# Patient Record
Sex: MALE | Race: WHITE | NOT HISPANIC OR LATINO | URBAN - METROPOLITAN AREA
[De-identification: names, ages, dates, MRNs, and addresses within clinical notes are randomized per-mention and may not be internally consistent; named-entity substitution may affect disease eponyms.]

---

## 2023-03-16 LAB
ALBUMIN (G/DL) IN SER/PLAS: 4.7 G/DL (ref 3.4–5)
ANION GAP IN SER/PLAS: 10 MMOL/L (ref 10–20)
APPEARANCE, URINE: CLEAR
BILIRUBIN, URINE: NEGATIVE
BLOOD, URINE: NEGATIVE
CALCIUM (MG/DL) IN SER/PLAS: 9.8 MG/DL (ref 8.6–10.3)
CARBON DIOXIDE, TOTAL (MMOL/L) IN SER/PLAS: 32 MMOL/L (ref 21–32)
CHLORIDE (MMOL/L) IN SER/PLAS: 101 MMOL/L (ref 98–107)
COLOR, URINE: COLORLESS
CREATININE (MG/DL) IN SER/PLAS: 1.02 MG/DL (ref 0.5–1.3)
CREATININE (MG/DL) IN URINE: 12 MG/DL (ref 20–370)
GFR MALE: >90 ML/MIN/1.73M2
GLUCOSE (MG/DL) IN SER/PLAS: 83 MG/DL (ref 74–99)
GLUCOSE, URINE: NEGATIVE MG/DL
KETONES, URINE: NEGATIVE MG/DL
LEUKOCYTE ESTERASE, URINE: NEGATIVE
NITRITE, URINE: NEGATIVE
PH, URINE: 7 (ref 5–8)
PHOSPHATE (MG/DL) IN SER/PLAS: 2.8 MG/DL (ref 2.5–4.9)
POTASSIUM (MMOL/L) IN SER/PLAS: 4.2 MMOL/L (ref 3.5–5.3)
PROTEIN, URINE: NEGATIVE MG/DL
SODIUM (MMOL/L) IN SER/PLAS: 139 MMOL/L (ref 136–145)
SPECIFIC GRAVITY, URINE: 1 (ref 1–1.03)
UREA NITROGEN (MG/DL) IN SER/PLAS: 11 MG/DL (ref 6–23)
UROBILINOGEN, URINE: <2 MG/DL (ref 0–1.9)

## 2024-06-10 ENCOUNTER — HOSPITAL ENCOUNTER (OUTPATIENT)
Dept: RADIOLOGY | Facility: CLINIC | Age: 24
Discharge: HOME | End: 2024-06-10
Payer: COMMERCIAL

## 2024-06-10 ENCOUNTER — OFFICE VISIT (OUTPATIENT)
Dept: SPORTS MEDICINE | Facility: CLINIC | Age: 24
End: 2024-06-10
Payer: COMMERCIAL

## 2024-06-10 VITALS
HEART RATE: 85 BPM | DIASTOLIC BLOOD PRESSURE: 64 MMHG | BODY MASS INDEX: 25.05 KG/M2 | HEIGHT: 70 IN | SYSTOLIC BLOOD PRESSURE: 120 MMHG | WEIGHT: 175 LBS

## 2024-06-10 DIAGNOSIS — M54.12 LEFT CERVICAL RADICULOPATHY: ICD-10-CM

## 2024-06-10 DIAGNOSIS — M62.838 CERVICAL PARASPINAL MUSCLE SPASM: ICD-10-CM

## 2024-06-10 DIAGNOSIS — S49.92XA INJURY OF GLENOID LABRUM, LEFT, INITIAL ENCOUNTER: ICD-10-CM

## 2024-06-10 DIAGNOSIS — S16.1XXA CERVICAL STRAIN, ACUTE, INITIAL ENCOUNTER: ICD-10-CM

## 2024-06-10 DIAGNOSIS — M51.26 HERNIATED LUMBAR DISC WITHOUT MYELOPATHY: ICD-10-CM

## 2024-06-10 DIAGNOSIS — S43.102A AC SEPARATION, LEFT, INITIAL ENCOUNTER: ICD-10-CM

## 2024-06-10 DIAGNOSIS — M46.42 DISCITIS OF CERVICAL REGION: ICD-10-CM

## 2024-06-10 DIAGNOSIS — M54.2 CERVICALGIA: Primary | ICD-10-CM

## 2024-06-10 DIAGNOSIS — S43.002A SHOULDER SUBLUXATION, LEFT, INITIAL ENCOUNTER: ICD-10-CM

## 2024-06-10 DIAGNOSIS — M54.2 CERVICALGIA: ICD-10-CM

## 2024-06-10 DIAGNOSIS — M95.8 BILATERAL WINGED SCAPULA: ICD-10-CM

## 2024-06-10 DIAGNOSIS — M25.512 ACUTE PAIN OF LEFT SHOULDER: ICD-10-CM

## 2024-06-10 DIAGNOSIS — S46.012A ROTATOR CUFF STRAIN, LEFT, INITIAL ENCOUNTER: ICD-10-CM

## 2024-06-10 DIAGNOSIS — M75.22 BICEPS TENDINITIS, LEFT: ICD-10-CM

## 2024-06-10 PROCEDURE — 99204 OFFICE O/P NEW MOD 45 MIN: CPT | Performed by: FAMILY MEDICINE

## 2024-06-10 PROCEDURE — 96372 THER/PROPH/DIAG INJ SC/IM: CPT | Performed by: FAMILY MEDICINE

## 2024-06-10 PROCEDURE — 1036F TOBACCO NON-USER: CPT | Performed by: FAMILY MEDICINE

## 2024-06-10 PROCEDURE — 99214 OFFICE O/P EST MOD 30 MIN: CPT | Performed by: FAMILY MEDICINE

## 2024-06-10 PROCEDURE — 72050 X-RAY EXAM NECK SPINE 4/5VWS: CPT

## 2024-06-10 PROCEDURE — 72050 X-RAY EXAM NECK SPINE 4/5VWS: CPT | Performed by: RADIOLOGY

## 2024-06-10 PROCEDURE — 73030 X-RAY EXAM OF SHOULDER: CPT | Mod: LEFT SIDE | Performed by: RADIOLOGY

## 2024-06-10 PROCEDURE — 73030 X-RAY EXAM OF SHOULDER: CPT | Mod: LT

## 2024-06-10 PROCEDURE — 2500000004 HC RX 250 GENERAL PHARMACY W/ HCPCS (ALT 636 FOR OP/ED): Performed by: FAMILY MEDICINE

## 2024-06-10 RX ORDER — NAPROXEN 250 MG/1
250 TABLET ORAL
COMMUNITY

## 2024-06-10 RX ORDER — CYCLOBENZAPRINE HCL 10 MG
10 TABLET ORAL NIGHTLY PRN
Qty: 30 TABLET | Refills: 0 | Status: SHIPPED | OUTPATIENT
Start: 2024-06-10 | End: 2024-07-10

## 2024-06-10 RX ORDER — KETOROLAC TROMETHAMINE 30 MG/ML
30 INJECTION, SOLUTION INTRAMUSCULAR; INTRAVENOUS ONCE
Status: COMPLETED | OUTPATIENT
Start: 2024-06-10 | End: 2024-06-10

## 2024-06-10 RX ORDER — CYCLOBENZAPRINE HCL 10 MG
10 TABLET ORAL NIGHTLY PRN
COMMUNITY
Start: 2024-05-28 | End: 2024-06-10 | Stop reason: SDUPTHER

## 2024-06-10 RX ORDER — CALCIUM CARBONATE/VITAMIN D3 500-10/5ML
1 LIQUID (ML) ORAL
COMMUNITY

## 2024-06-10 RX ORDER — PREDNISONE 20 MG/1
TABLET ORAL
Qty: 30 TABLET | Refills: 0 | Status: SHIPPED | OUTPATIENT
Start: 2024-06-10

## 2024-06-10 RX ORDER — ACETAMINOPHEN 500 MG
500 TABLET ORAL EVERY 6 HOURS PRN
COMMUNITY

## 2024-06-10 RX ORDER — METHYLPREDNISOLONE ACETATE 40 MG/ML
40 INJECTION, SUSPENSION INTRA-ARTICULAR; INTRALESIONAL; INTRAMUSCULAR; SOFT TISSUE ONCE
Status: COMPLETED | OUTPATIENT
Start: 2024-06-10 | End: 2024-06-10

## 2024-06-10 RX ADMIN — METHYLPREDNISOLONE ACETATE 40 MG: 40 INJECTION, SUSPENSION INTRA-ARTICULAR; INTRALESIONAL; INTRAMUSCULAR; SOFT TISSUE at 15:56

## 2024-06-10 RX ADMIN — KETOROLAC TROMETHAMINE 30 MG: 60 INJECTION, SOLUTION INTRAMUSCULAR at 15:54

## 2024-06-10 RX ADMIN — METHYLPREDNISOLONE SODIUM SUCCINATE 125 MG: 125 INJECTION, POWDER, FOR SOLUTION INTRAMUSCULAR; INTRAVENOUS at 15:55

## 2024-06-10 ASSESSMENT — LIFESTYLE VARIABLES
HOW OFTEN DO YOU HAVE A DRINK CONTAINING ALCOHOL: 2-4 TIMES A MONTH
SKIP TO QUESTIONS 9-10: 1
HAS A RELATIVE, FRIEND, DOCTOR, OR ANOTHER HEALTH PROFESSIONAL EXPRESSED CONCERN ABOUT YOUR DRINKING OR SUGGESTED YOU CUT DOWN: NO
AUDIT TOTAL SCORE: 2
HOW MANY STANDARD DRINKS CONTAINING ALCOHOL DO YOU HAVE ON A TYPICAL DAY: 1 OR 2
AUDIT-C TOTAL SCORE: 2
HOW OFTEN DO YOU HAVE SIX OR MORE DRINKS ON ONE OCCASION: NEVER
HAVE YOU OR SOMEONE ELSE BEEN INJURED AS A RESULT OF YOUR DRINKING: NO

## 2024-06-10 ASSESSMENT — PAIN SCALES - GENERAL
PAINLEVEL: 5
PAINLEVEL_OUTOF10: 7

## 2024-06-10 ASSESSMENT — PAIN - FUNCTIONAL ASSESSMENT: PAIN_FUNCTIONAL_ASSESSMENT: 0-10

## 2024-06-10 NOTE — PATIENT INSTRUCTIONS
Treatment or Intervention:  May continue to alternate moist heat and ice as needed     Reviewed handout rotator cuff injury and/or labral injury in detail with the patient to the level of their understanding; a copy of this handout was provided to the patient at the time of this office visit.   Start into Physical Therapy 1-2 times a week for 8-10 weeks with manual therapy as well as dry needling, cervical traction, and IASTM    Reviewed home exercises to be performed by the patient routinely, including wall crawls, circumduction exercises, resistance band exercises, as well as additional exercises from rotator cuff and/or labral    Will recommend at future visit: over-the-counter calcium with vitamin-D 2 -3000+ milligrams a day, as well as OTC symphytum as directed daily to promote bony healing, in addition to a daily multivitamin.  ,  Will recommend at future visit:  over-the-counter curcumin, turmeric, boswellia, as well as egg shell membrane as directed to aid with joint inflammation.    Will recommend at future visit:  over-the-counter Move Free for joint health.     Patient received IM injection of SoluMedrol 125 milligrams at the time of this office visit. , Patient received IM injection of Toradol 60 milligrams at the time of this office visit., Patient received IM injection of DepoMedrol 40 milligrams at the time of this office visit., and  The patient tolerated the injection(s) well and without any adverse effects as observed, verified s/p 15 minutes.  The following prescription was electronically sent to the patient's pharmacy of record: 10 DAY TAPER:  The following prescription was electronically sent to the patient's pharmacy of record: Prednisone 20mg, take 4 tablets a day x 4 days, 3 tabletss a day x 3 days, 2 tablets a day x 2 days, and 1 tablet a day x 1 day with food; start tomorrow if received Solu-Medrol injection in the office at visit, otherwise start immediately, dispense quantity sufficient,  with no refills   The following prescription was electronically sent to the patient's pharmacy of record: Flexeril (Cyclobenzaprine) 10 milligrams, may take 1 tablet 1-2 hours before bedtime as needed for muscle relaxation, Duration: 30 days, Dispense: #30, Refill: 0    the patient may alternate with OTC Tylenol Extra Strength or OTC Tylenol Arthritis, taking one every 6-8 hours with food as needed.  ,  Patient advised regarding the risks and/or potential adverse reactions and/or side effects of any prescribed medications along with any over-the-counter medications or any supplements used. Patient advised to seek immediate medical care if any adverse reactions occur. The patient and/or patient(s) parent(s) verbalized their understanding.     Discussed in detail with the patient to the level of their understanding the possibility in the future of regenerative injections versus corticosteroid injections  ,   MR Arthrogram of the patient's  LEFT Shoulder to rule out labrum tear vs rotator cuff tear vs AC joint separation vs other   MR Cervical spine to rule out strain vs herniated or bulging disc vs other   You have been ordered an MRI of the Cervical spine and an MR Arthrogram of the LEFT Shoulder. Once you contact scheduling at [phone] and obtain the date and time of your MRI/MR Arthrogram, contact our office at [phone] to schedule your follow-up appointment to review your results. Please note the results of your imaging will not be discussed over the telephone.  Follow up after MR Arthrogram of the LEFT shoulder and Cervical Spine MRI, sooner if needed.

## 2024-06-10 NOTE — PROGRESS NOTES
Verbal consent of the patient and/or verbal parental consent for patients under the age of 18 have been obtained to conduct a physical examination at this office visit.    New patient  History Of Present Illness  06/10/24 Miguel Valentin is a 23 y.o. male who presents for an evaluation of their Left Shoulder and CERVICAL spine. 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 He states that he was performing pushups when he suddenly felt his LEFT arm collapse where he subsequently blacked out and could not see out of his LEFT eye. The pain he experienced was posterior in his shoulder and went up into his cervical spine. At that time he consulted with the medical staff at the [facility] who did not perform any imaging and prescribed him prednisone for 3 weeks. His symptoms resolved and he had been doing well until 3 weeks ago on 5/20/24 when he fell asleep standing in the shower after working many hours as a  and collided his LEFT shoulder with the wall while side bending his CERVICAL spine to the RIGHT. He once again has similar pain to the posterior aspect of his shoulder up his trapezius and into the LEFT side of his CERVICAL spine. He feels that the muscles of the area are rather tight. The pain is worsened with neck extension where he gets shooting pain into his shoulder.  He has been doing physical therapy however it has been making him worse so he was sent into the office for further evaluation and workup since it has not been getting any better and his symptoms are getting worse for physical therapy.  He denies any radiating symptoms but does have some clicking and worsening of pain with OH movements. Miguel reports a feeling of 'restlessness' down his LEFT arm. The pain presently is 3/10 but will reach 7/10 with movement. He did seek treatment at the [facility] on 5/27/24; however, the  facility did not have any power and therefore he did not receive any imaging. He was prescribed cyclobenzaprine which he has taken a few times when he is not working in addition to Aleve, tylenol and magnesium   However however today did not seem to be helping his pain.  He is unable to take NSAIDs because of only having 1 kidney..     All previous Progress Notes and imaging results related to this patients chief complaint have been reviewed in preparation for this examination.    Past Medical History  He has a past medical history of Migraines, Multicystic kidney disease, and Other specified health status.    Surgical History  He has a past surgical history that includes Nephrectomy (Left).     Social History  He reports that he has never smoked. He has never used smokeless tobacco. No history on file for alcohol use and drug use.    Family History  No family history on file.     Allergies  Patient has no known allergies.    Review of Systems  CONSTITUTIONAL:   Negative for weight change, loss of appetite, fatigue, weakness, fever, chills, night sweats, headaches .           HEENT:   Negative for cold, cough, sore throat, sinus pain, swollen lymph nodes.           OPHTHALMOLOGY:   Negative for diminished vision, blurred vision, loss of vision, double vision.           ALLERGY:   Negative for runny nose, scratchy throat, sinus congestion, rash, facial pressure, nasal congestion, post-nasal drip.           CARDIOLOGY:   Negative for chest pain, palpitations, murmurs, irregular heart beat, shortness of breath, leg edema, dyspnea on exertion, fatigue, dizziness.           RESPIRATORY:   Negative for chest pain, shortness of breath, swelling of the legs, asthma/copd, chest congestion, pain with breathing .           GASTROENTEROLOGY:   Negative for nausea, vomitting, heartburn, constipation, diarrhea, blood in stool, change in bowel habits, black stool.           HEMATOLOGY/LYMPH:   Negative for fatigue, loss of  appetitie, easy bruising, easy bleeding, anemia, abnormal bleeding, slow healing.           ENDOCRINOLOGY:   Negative for polyuria, polydipsia, polyphagia, fatigue, weight loss, weight gain, cold intolerance, heat intolerance, diabetes.           MUSCULOSKELETAL:   Positive  for Left Shoulder pain and Cervical Spine pain         DERMATOLOGY:   Negative for rash, bruising.           NEUROLOGY:   Negative for tingling, numbness, gait abnormality, paresthesias, weakness, sciatica.        General Examination:  GENERAL APPEARANCE: Appears well, pleasant, and cooperative, NAD.   HEENT: Normal, unremarkable.   NECK: Supple.   HEART: RRR, normal S1S2.   LUNGS: Clear to auscultation bilaterally.   ABDOMEN: Soft, NT/ND, BS present.   EXTREMITIES: No cyanosis, clubbing.   SKIN: No rash or cellulitis.   NEUROLOGIC EXAM: Awake, A&O x 3, CN's II-XII grossly intact.   PSYCH: Good eye contact, appropriate mood and affect     The , Gege, was present in the room during the entire visit including, but not limited to the physical examination!.    Examination:  CERVICAL   Erythema: Negative.   Edema: Negative.   Effusion: Negative.   TART Findings: Positive:  Tissue Texture Changes, Asymmetry, Restriction, Tenderness. Paraspinal Muscle spasms C2-C7,, Upper Trapezius muscle spasm LEFT   Warmth: Negative.   Ecchymosis/Bruising: Negative.   Percussion Test (CERVICAL): Negative.   Tuning Fork Test (CERVICAL): Negative.   Percussion Test (THORACIC):  Negative.   Tuning Fork Test (THORACIC):  Negative.   Abrasions: Negative.     Orientation (CERVICAL): Positive:   Decreased cervical lordosis because of muscle spasms  Orientation (THORACIC):  Normal.     ROM (CERVICAL):Positive:     Forward Flexion, Extension Normal  Decreased LEFT>RIGHT  Lateral Bending (Side Bending).   Decreased Rotation LEFT > RIGHT     ROM (THORACIC):   FROM, Flexion, Extension, Rotation, and Side Bending.     Muscle Strength:   Negative: Cervical  Flexion, Extension, Left Rotation, Right Rotation, Left Lateral Flexion, Right Lateral Flexion, Trapezius (Shrug), Anterior Deltoid, Posterior Deltoid and Lateral Deltoid.          DTR/Neurological: Symmetrical  +2/+4 Biceps Reflex (C5)  +2/+4 Brachioradialis Reflex (C6)  +2/+4 Triceps Reflex (C7).     Sensation/Neurological Cervical:   Positive:   C2: Lower jaw and back of head  C3: Upper neck and back of head  C4: Lower neck, upper shoulders, and upper chest  Positive: Decreased LEFT C5: Area of collarbones, lateral upper arms, and upper chest  C6: Lateral forearms, thumbs, anterior index finger, and lateral half of the middle finger  C7: Some of posterior index finger, medial half of middle finger, upper posterior back, and back of arms  C8: Ring finger, little finger, medial forearm, and posterior upper back.     Sensation/Neurological Thoracic:   Negative, Symmetrical; 2-Point Discrimination Test: Negative  T1: Medial anterior upper arm, axilla, upper chest and posterior back  T2: Upper chest and posterior back  T3: Upper chest and posterior back  T4: Upper chest (nipple area) and posterior back  T5: Mid chest and posterior back  T6: Mid chest and posterior back  T7: Mid chest and posterior back  T8: Upper abdomen and middle posterior back  T9: Upper abdomen and middle posterior back  T10: Abdomen (area of belly button) and middle posterior back  T11: Abdomen and middle posterior back  T12: Lower abdomen and middle posterior back.     Palpation:   Positive: Tenderness to Palpation C2-C7 articular pillars, palpable upper trapezius and cervical paraspinal muscle spasms LEFT    Vascular:   +2/+4,Carotid  +2/+4 Radial  Capillary Refill< 2 seconds.     Cervicle Spine Tests:  Lhermitte's Sign: Negative.   Spurling's Test (Atlanto-Axial Compression Test): Positive:   Reverse Spurling's Test: Positive:   Adson's Test: Negative.   Wilmer's Test: Negative.   Parnell's Test: Negative.   Costoclavicular Test: Negative.    Cervical Spine Distraction Test: Negative.   Tinel's Sign: Negative.   Brachial Plexus Tension Test: Negative.   Brachial Plexus Stretch Test: Negative.   Neutral Axial Compression Test: Negative.   Shoulder ABduction (Bakody) Test: Negative.   Cervical Compression with Max Foraminal Compression Test: Positive: .   Intervertebral Foramina Compression Test: Negative.   Flexion Compression Test: Negative.   Extension Compression Test: Negative.   Maximum Compression of the Intervertebral Foramina Test: Positive:   Forward Flexion Test: Negative.   Swallowing Test: Negative.   Percussion Test: Negative.   Parada-Eckert Test: Negative.   O'Bull Test: Negative.   Shoulder Press Test: Negative.   Alexi Compression Test: Negative.   Rotation Compression Test: Negative.   Side Bending Compression Test: Negative.   Thoracic Outlet Test: Negative.   Vertebral Artery Test: Negative.   Valsalva Maneuver Test: Negative.         Thoracic Spine Tests:  Pate Forward Bend Test: Negative.   Brudzinski's/Kernig's Test: Negative.   Slump Test: Negative.   First Thoracic Neve Root Test: Negative.   Passive Scapular Approximation: Negative.   Scapular Elevation Test: Negative.   Scapular Retraction Test: Negative.   Scapular Protraction (Winging): Negative.   Costoclavicular Test: Negative.   Elevated Arms Stress Test (Cherise Test): Negative.   Kyphosis Test on Hands and Knees: Negative.   Segmental Function in the Thoracic Spine in Extension Test: Negative.         Chest Tests:  Sternum Compression Test: Negative.   Costosternal Pressure Test: Negative.   Rib Compression Test: Negative.   Schepelmann Test: Negative.   Pectoralis Major Contracture Test: Negative.   Chest Circumference Test: Negative.          Examination:  Left Shoulder     Erythema: Negative.   Edema: Negative.   Effusion: Negative.   Warmth: Negative.   Ecchymosis/Bruising: Negative.   Percussion Test: Negative.   Tuning Fork Test: Negative.   Abrasions: Negative.    Orientation: Symmetrical.            ROM:   Positive:   Forward Flexion (0-180 degrees)  Extension (0-60 degrees)  ABduction (0-180 degrees)  ADduction (30-50 degrees)  External Rotation with elbow at side (0-90 degrees)  Internal Rotation with elbow at side (0-70 degrees)  Horizontal ABduction (0-90 degrees)  Horizontal ADduction (0-45 degrees)  External Rotation with elbow at 90 degrees of ABduction [0-() degrees]          Internal Rotation with elbow at 90 degrees of ABduction [0-(70-90) degrees]  Positive Apley's Shoulder Scratch Test Superiorly Tests a Combination of: Flexion, External Rotation, and Scapular Abduction  LEFT arm reaches: T4   RIGHT arm reaches: T4  Positive Apley's Shoulder Scratch Test Inferiorly Tests a Combination of: Extension, Internal Rotation, and Scapular Adduction  LEFT arm reaches: T3  RIGHT arm reaches: T3    Muscle Strength:   +5/+5: Internal Rotation - subscapularis  +5/+5: External Rotation - infraspinatus and teres minor  +5/+5: ABduction - supraspinatus and deltoid  +5/+5: ABduction with thumbs down and 30 degrees horizontal ADduction - supraspinatus  +5/+5: Palms up with elbow bent to 15 degrees flexion and resisted upward motion - biceps  +5/+5: Simultaneous resisted supination and elbow flexion- biceps  +5/+5: Flexion  +5/+5: Extension  +5/+5: ABduction  +5/+5: ADduction  +5/+5: Internal Rotation at 90 Degrees  +5/+5: External Rotation at 90 Degrees         +5/+5: Internal Rotation at 0 Degrees  +5/+5: External Rotation at 0 Degrees  +5/+5: Apley's Shoulder Scratch Test Superiorly Tests a Combination of: Flexion, External Rotation, and Scapular ABduction  +5/+5:Apley's Shoulder Scratch Test Inferiorly Tests a Combination of: Extension, Internal Rotation, and Scapular ADduction  +5/+5: Triceps  +5/+5: Biceps            DTR/Neurological:   Negative, Symmetrical:  +2/+4 DTR's: Biceps Brachi (C5)  +2/+4 DTR's: Brachioradialis (C6)  +2/+4 DTR's: Triceps (C7).          Sensation/Neurological:   Positive:   C2: Lower jaw and back of head  C3: Upper neck and back of head  C4: Lower neck, upper shoulders and upper chest  Positive:  Decreased LEFT C5: Area of collarbones, lateral upper arms and upper chest  C6: Lateral forearms, thumbs, anterior index finger and lateral half of the middle finger  C7: Some of posterior index finger, medial half of middle finger, upper posterior back, and back of arms  C8: Ring finger, little finger, medial forearm and posterior upper back  T1: Medial anterior upper arm, axilla, upper chest, and posterior back            Palpation: Positive:  Tenderness to Palpation Biceps long head tendon, RTC interval             Vascular:   Capillary Refill less than 2 seconds , Negative, Symmetrical:  +2/+4: Carotid pulse  +2/+4: Radial pulse  +2/+4: Ulnar pulse  +2/+4: Brachial pulse.            Winging Scapula: Positive: BILATERAL.        Shoulder - AC Joint:  Painful Arc 120-180 degrees:  Negative.   AC Compression Test:  Negative.   Cross Body ADduction Stress Test:  Negative.   Piano Key Sign:  Negative.   AC Distraction Test:  Negative.            Shoulder - Biceps:  Abbott-Gil Test: Negative.   Speeds Test: Positive:   Yergason Test: Negative.            Shoulder - Impingement:  Neer Test:  Negative.   Hawken-Morgan Test:  Negative.   Painful Arc  degrees:  Negative.            Shoulder - Infraspinatus:  Resisted ER at 0 degrees ABduction:  Negative with arm at side.            Shoulder - Labrum/Instability:  Anterior Apprehension Test:  Positive:  pain relieved with anterior and posterior pressure  Anterior Release/Surprise Test:  Positive:    Anterior Drawer Test:  Negative.   Bicep Flexion at 90 degrees ABduction Test:  Negative.   Bicipital Load Test:  Negative.   Posterior Apprehension Test:  Positive:  pain relieved with anterior and posterior pressure  Relocation Test:  Negative.   Sulcus Sign:  Negative.   Silva Test:  Negative.  "  Anterior Slide Test:  Negative.   Clunk Test:  Negative.   Grind Test:  Positive:    Davenport Test:  Negative.   Crank Test:  Negative.   Load and Shift Test:  Negative.       Horizontal Adduction Thumb Down: Negative  Shoulder - Subscapularis:  Bear Hug Test:  Negative.   Lift Off Test:  Negative.   Mesquite Test:  Negative.   Resisted Elbow Push Down Test:  Negative.   Resisted IR at 90 degrees:  Negative.   Resisted Lift Off \"\" Test:  Negative.         Shoulder - Supraspinatus:  Full Can Test: Positive: good strength causes mild pain   Empty Can Test: Positive: good strength causes mild pain   Resisted Elbow Push Up Test: Negative.   Drop Arm Test: Negative.   Ashley Test: Negative.   Lateral Ashley Test: Negative.   Zero Degree ABduction Test: Negative.   Painful Arc  degrees: Negative  degrees.              Shoulder - Teres Minor:  Resisted ER at 90 degrees ABduction:  Negative.            Shoulder - Vascular Thoracic Outlet Syndrome:  Cherise Test: Negative.   Adson's Test: Negative.   Wilmer's Test: Negative.   Parnell Test: Negative.   Vilma Test: Negative.           Imaging and Diagnostics Review:  XR cervical spine and LEFT shoulder ordered today  MR Arthrogram LEFT shoulder and MRI cervical spine ordered today     Assessment   1. Cervicalgia  XR cervical spine complete 4-5 views    MR cervical spine wo IV contrast    Referral to Physical Therapy      2. Cervical paraspinal muscle spasm  MR cervical spine wo IV contrast    Referral to Physical Therapy    cyclobenzaprine (Flexeril) 10 mg tablet      3. Cervical strain, acute, initial encounter  MR cervical spine wo IV contrast    Referral to Physical Therapy    methylPREDNISolone sod succinate (SOLU-Medrol) injection 125 mg      4. Discitis of cervical region  MR cervical spine wo IV contrast    Referral to Physical Therapy    methylPREDNISolone sod succinate (SOLU-Medrol) injection 125 mg    ketorolac (Toradol) injection 30 mg    " methylPREDNISolone acetate (DEPO-Medrol) injection 40 mg    predniSONE (Deltasone) 20 mg tablet      5. Acute pain of left shoulder  XR shoulder left 2+ views    Referral to Physical Therapy    MR arthrogram shoulder left    XR arthrogram shoulder left      6. Rotator cuff strain, left, initial encounter  Referral to Physical Therapy    MR arthrogram shoulder left    XR arthrogram shoulder left    ketorolac (Toradol) injection 30 mg    methylPREDNISolone acetate (DEPO-Medrol) injection 40 mg    predniSONE (Deltasone) 20 mg tablet      7. Biceps tendinitis, left  Referral to Physical Therapy    MR arthrogram shoulder left    XR arthrogram shoulder left    predniSONE (Deltasone) 20 mg tablet      8. Injury of glenoid labrum, left, initial encounter  Referral to Physical Therapy    MR arthrogram shoulder left    XR arthrogram shoulder left      9. AC separation, left, initial encounter  Referral to Physical Therapy    MR arthrogram shoulder left    XR arthrogram shoulder left      10. Shoulder subluxation, left, initial encounter  Referral to Physical Therapy    MR arthrogram shoulder left    XR arthrogram shoulder left      11. Left cervical radiculopathy  MR cervical spine wo IV contrast    Referral to Physical Therapy      12. Bilateral winged scapula  Referral to Physical Therapy    MR arthrogram shoulder left    XR arthrogram shoulder left      13. Herniated lumbar disc without myelopathy  predniSONE (Deltasone) 20 mg tablet          Treatment or Intervention:  May continue to alternate moist heat and ice as needed     Reviewed rotator cuff injury and/or labral injury in detail with the patient to the level of their understanding at the time of this office visit.    continue Physical Therapy 1-2 times a week for 8-10 weeks with manual therapy as well as dry needling, cervical traction, and IASTM    Reviewed home exercises to be performed by the patient routinely, including wall crawls, circumduction exercises,  resistance band exercises, as well as additional exercises from rotator cuff and/or labral    Will recommend at future visit: over-the-counter calcium with vitamin-D 2 -3000+ milligrams a day, as well as OTC symphytum as directed daily to promote bony healing, in addition to a daily multivitamin.  ,  Will recommend at future visit:  over-the-counter curcumin, turmeric, boswellia, as well as egg shell membrane as directed to aid with joint inflammation.    Will recommend at future visit:  over-the-counter Move Free for joint health.     Patient received IM injection of SoluMedrol 125 milligrams at the time of this office visit. , Patient received IM injection of Toradol 60 milligrams at the time of this office visit., Patient received IM injection of DepoMedrol 40 milligrams at the time of this office visit., and  The patient tolerated the injection(s) well and without any adverse effects as observed, verified s/p 15 minutes.  The following prescription was electronically sent to the patient's pharmacy of record: 10 DAY TAPER:  The following prescription was electronically sent to the patient's pharmacy of record: Prednisone 20mg, take 4 tablets a day x 4 days, 3 tablets a day x 3 days, 2 tablets a day x 2 days, and 1 tablet a day x 1 day with food; start tomorrow if received Solu-Medrol injection in the office at visit, otherwise start immediately, dispense quantity sufficient, with no refills   The following prescription was electronically sent to the patient's pharmacy of record: Flexeril (Cyclobenzaprine) 10 milligrams, may take 1 tablet 1-2 hours before bedtime as needed for muscle relaxation, Duration: 30 days, Dispense: #30, Refill: 0    the patient may alternate with OTC Tylenol Extra Strength or OTC Tylenol Arthritis, taking one every 6-8 hours with food as needed.  ,  Patient advised regarding the risks and/or potential adverse reactions and/or side effects of any prescribed medications along with any  over-the-counter medications or any supplements used. Patient advised to seek immediate medical care if any adverse reactions occur. The patient and/or patient(s) parent(s) verbalized their understanding.     Discussed in detail with the patient to the level of their understanding the possibility in the future of regenerative injections versus corticosteroid injections  ,   MR Arthrogram of the patient's  LEFT Shoulder to rule out labrum tear vs rotator cuff tear vs AC joint separation vs other   MR Cervical spine to rule out strain vs herniated or bulging disc vs other   You have been ordered an MRI of the Cervical spine and an MR Arthrogram of the LEFT Shoulder. Once you contact scheduling at [phone] and obtain the date and time of your MRI/MR Arthrogram, contact our office at [phone] to schedule your follow-up appointment to review your results. Please note the results of your imaging will not be discussed over the telephone.  Follow up after MR Arthrogram of the LEFT shoulder and Cervical Spine MRI, sooner if needed.   Please note that this report has been produced using speech recognition software.  It may contain errors related to grammar, punctuation or spelling.  Electronically signed, but not reviewed.  REX Hargrove, Director of Sports Medicine      WAYNE ARREGUIN on 6/10/24 at 5:18 PM.     CIARA Hargrove DO

## 2024-06-22 ENCOUNTER — HOSPITAL ENCOUNTER (EMERGENCY)
Facility: HOSPITAL | Age: 24
Discharge: HOME | End: 2024-06-22
Payer: COMMERCIAL

## 2024-06-22 ENCOUNTER — APPOINTMENT (OUTPATIENT)
Dept: RADIOLOGY | Facility: HOSPITAL | Age: 24
End: 2024-06-22
Payer: COMMERCIAL

## 2024-06-22 VITALS
DIASTOLIC BLOOD PRESSURE: 87 MMHG | HEART RATE: 92 BPM | BODY MASS INDEX: 25.25 KG/M2 | HEIGHT: 70 IN | TEMPERATURE: 97.7 F | WEIGHT: 176.37 LBS | SYSTOLIC BLOOD PRESSURE: 138 MMHG | OXYGEN SATURATION: 99 % | RESPIRATION RATE: 18 BRPM

## 2024-06-22 DIAGNOSIS — Z87.820 HISTORY OF CLOSED HEAD INJURY: ICD-10-CM

## 2024-06-22 DIAGNOSIS — R20.2 ARM PARESTHESIA, LEFT: ICD-10-CM

## 2024-06-22 DIAGNOSIS — R51.9 ACUTE NONINTRACTABLE HEADACHE, UNSPECIFIED HEADACHE TYPE: Primary | ICD-10-CM

## 2024-06-22 DIAGNOSIS — R03.0 ELEVATED BLOOD PRESSURE READING: ICD-10-CM

## 2024-06-22 DIAGNOSIS — M54.2 NECK PAIN: ICD-10-CM

## 2024-06-22 PROCEDURE — 72125 CT NECK SPINE W/O DYE: CPT

## 2024-06-22 PROCEDURE — 2500000004 HC RX 250 GENERAL PHARMACY W/ HCPCS (ALT 636 FOR OP/ED): Performed by: CLINICAL NURSE SPECIALIST

## 2024-06-22 PROCEDURE — 99285 EMERGENCY DEPT VISIT HI MDM: CPT | Mod: 25

## 2024-06-22 PROCEDURE — 96375 TX/PRO/DX INJ NEW DRUG ADDON: CPT

## 2024-06-22 PROCEDURE — 72125 CT NECK SPINE W/O DYE: CPT | Performed by: RADIOLOGY

## 2024-06-22 PROCEDURE — 96374 THER/PROPH/DIAG INJ IV PUSH: CPT

## 2024-06-22 PROCEDURE — 70450 CT HEAD/BRAIN W/O DYE: CPT

## 2024-06-22 PROCEDURE — 70450 CT HEAD/BRAIN W/O DYE: CPT | Performed by: RADIOLOGY

## 2024-06-22 RX ORDER — ACETAMINOPHEN 325 MG/1
650 TABLET ORAL ONCE
Status: COMPLETED | OUTPATIENT
Start: 2024-06-22 | End: 2024-06-22

## 2024-06-22 RX ORDER — DIPHENHYDRAMINE HYDROCHLORIDE 50 MG/ML
25 INJECTION INTRAMUSCULAR; INTRAVENOUS ONCE
Status: COMPLETED | OUTPATIENT
Start: 2024-06-22 | End: 2024-06-22

## 2024-06-22 RX ORDER — METOCLOPRAMIDE HYDROCHLORIDE 5 MG/ML
10 INJECTION INTRAMUSCULAR; INTRAVENOUS ONCE
Status: COMPLETED | OUTPATIENT
Start: 2024-06-22 | End: 2024-06-22

## 2024-06-22 RX ADMIN — DIPHENHYDRAMINE HYDROCHLORIDE 25 MG: 50 INJECTION, SOLUTION INTRAMUSCULAR; INTRAVENOUS at 16:52

## 2024-06-22 RX ADMIN — ACETAMINOPHEN 650 MG: 325 TABLET ORAL at 16:52

## 2024-06-22 RX ADMIN — SODIUM CHLORIDE 1000 ML: 900 INJECTION, SOLUTION INTRAVENOUS at 16:52

## 2024-06-22 RX ADMIN — METOCLOPRAMIDE 10 MG: 5 INJECTION, SOLUTION INTRAMUSCULAR; INTRAVENOUS at 16:52

## 2024-06-22 ASSESSMENT — PAIN - FUNCTIONAL ASSESSMENT
PAIN_FUNCTIONAL_ASSESSMENT: 0-10
PAIN_FUNCTIONAL_ASSESSMENT: 0-10

## 2024-06-22 ASSESSMENT — COLUMBIA-SUICIDE SEVERITY RATING SCALE - C-SSRS
1. IN THE PAST MONTH, HAVE YOU WISHED YOU WERE DEAD OR WISHED YOU COULD GO TO SLEEP AND NOT WAKE UP?: NO
6. HAVE YOU EVER DONE ANYTHING, STARTED TO DO ANYTHING, OR PREPARED TO DO ANYTHING TO END YOUR LIFE?: NO
2. HAVE YOU ACTUALLY HAD ANY THOUGHTS OF KILLING YOURSELF?: NO

## 2024-06-22 ASSESSMENT — PAIN SCALES - GENERAL
PAINLEVEL_OUTOF10: 5 - MODERATE PAIN
PAINLEVEL_OUTOF10: 1

## 2024-06-22 NOTE — ED PROVIDER NOTES
Department of Emergency Medicine   ED  Provider Note  Admit Date/RoomTime: 6/22/2024  3:32 PM  ED Room: ST26/ST26        History of Present Illness:  Chief Complaint   Patient presents with    Migraine     Pt states he has had an increasing headache starting on Thursday.  States he also has numbness in both arms and feels unsteady on his feet.  Had an initial fall with head and neck injury about 1 month ago.          Miguel Valentin is a 23 y.o. male history of migraine headaches.  Reports Thursday night around 11 PM he started with a low-grade headache.  Mostly proximal to sinuses took Tylenol woke up in the morning and his headache came back he was excruciating described as throbbing moving different places starting at his sinuses radiating around his temples and the back of his neck.  He tried over-the-counter medications Excedrin Tylenol ibuprofen.  Sudafed and caffeine.  The pain came down to a 2-10.  Then would come back throughout the day.  He then tried drinking more caffeine and taking other pain medication to go to sleep woke up in the middle the night pain had returned.  Today when he woke up the pain was back again took Excedrin.  Had some tingling in his left arm that started about 1015 and resolved at 1230.  Reports it gets worse when he has a flareup of his headache.  He does have history of migraine headaches.  This is not the worst headache of his life.  It does become tolerable with over-the-counter medications.  Last night he had an episode of dizziness where the room was spinning.  About a month ago he was in the shower and fell asleep falling backwards hitting his head and neck.  He is seen Dr. Craft who is ordered an MRI for this week for concern of possible neck strain versus disc injury.  Patient became concerned when his left arm had numbness and tingling.  He denies any photophobia but reports some glasses helped his pain.  His pain is now tolerable at this time at a 4 out of 10.  He  did not get a CAT scan of his head after the fall.  He reports most of his pain is over the left sinus area and behind his left eye today.  He denies any cough congestion runny nose sore throat no chest pain or shortness of breath no abdominal pain.  No vomiting but has had nausea.  Presents now for evaluation of his headache.    Review of Systems:   Pertinent positives and negatives are stated within HPI, all other systems reviewed and are negative.        --------------------------------------------- PAST HISTORY ---------------------------------------------  Past Medical History:  has a past medical history of Migraines, Multicystic kidney disease, and Other specified health status.  Past Surgical History:  has a past surgical history that includes Nephrectomy (Left).  Social History:  reports that he has never smoked. He has never used smokeless tobacco.  Family History: family history is not on file.. Unless otherwise noted, family history is non contributory  The patient’s home medications have been reviewed.  Allergies: Patient has no known allergies.        ---------------------------------------------------PHYSICAL EXAM--------------------------------------    GENERAL APPEARANCE: Awake and alert.   VITAL SIGNS: As per the nurses' triage record.  Elevated heart rate, elevated blood pressure  HEENT: Normocephalic, atraumatic.  No raccoon eyes or campbell signs no epistaxis noted.  No bite to the tongue or lip.  No pain palpation of the maxillary frontal sinuses extraocular muscles are intact. Pupils equal round and reactive to light. Conjunctiva are pink. Negative scleral icterus. Mucous membranes are moist. Tongue in the midline. Pharynx was without erythema or exudates, uvula midline  NECK: Soft Nontender and supple, full gross ROM, no meningeal signs.  No pain palpation of the cervical spine no step-offs crepitus or bruising  CHEST: Nontender to palpation. Clear to auscultation bilaterally. No rales,  "rhonchi, or wheezing.   HEART: Tachycardia S1, S2. Regular rate and rhythm. No murmurs, gallops or rubs.  Strong and equal pulses in the extremities.   ABDOMEN: Soft, nontender, nondistended, positive bowel sounds, no palpable masses.  MUSCULCSKELETAL:  Full gross active range of motion. Ambulating on own with no acute difficulties  NEUROLOGICAL: Awake, alert and oriented x 3. Power intact in the upper and lower extremities. Sensation is intact to light touch in the upper and lower extremities.  Cranial nerves II through XII grossly intact speech is clear facial movement symmetrical no focal deficits noted.  Pushes and pulls are equal and strong.  Test of skew negative NIH 0  IMMUNOLOGICAL: No lymphatic streaking noted   DERM: No petechiae, rashes, or ecchymoses.          ------------------------- NURSING NOTES AND VITALS REVIEWED ---------------------------  The nursing notes within the ED encounter and vital signs as below have been reviewed by myself  /87 (BP Location: Left arm, Patient Position: Sitting)   Pulse 92   Temp 36.5 °C (97.7 °F)   Resp 18   Ht 1.778 m (5' 10\")   Wt 80 kg (176 lb 5.9 oz)   SpO2 99%   BMI 25.31 kg/m²     Oxygen Saturation Interpretation: 99% room air      The patient’s available past medical records and past encounters were reviewed.          -----------------------DIAGNOSTIC RESULTS------------------------  LABS:    Labs Reviewed - No data to display    As interpreted by me, the above displayed labs are abnormal. All other labs obtained during this visit were within normal range or not returned as of this dictation.      ------------------------------ ED COURSE/MEDICAL DECISION MAKING----------------------  Medical Decision Making:   Exam: A medically appropriate exam performed, outlined above, given the known history and presentation.    History obtained from: Review of medical record nursing notes patient patient family      Social Determinants of Health considered " during this visit: Takes care of himself at home      PAST MEDICAL HISTORY/Chronic Conditions Affecting Care     has a past medical history of Migraines, Multicystic kidney disease, and Other specified health status.       CC/HPI Summary, Social Determinants of health, Records Reviewed, DDx, testing done/not done, ED Course, Reassessment, disposition considerations/shared decision making with patient, consults, disposition:   Presents to the emergency department complaints of migraine headache.  Patient reports the intensity is similar to previous migraines but is located in different spots.  Recent fall  Plan  CT head NO ACUTE INTRACRANIAL PROCESS. SKULL INTACT      NO PRONOUNCED VASOGENIC EDEMA OR OTHER FOCAL BRAIN ASYMMETRY TO  SUGGEST LARGE UNDERLYING MASS LESION      NO ACUTE FRACTURE OR SUBLUXATION IN THE CERVICAL SPINE  CT cervical  Benadryl  Tylenol  Reglan  Normal saline  NIH 0  Test of skew negative  Medical Decision Making/Differential Diagnosis:  Differentials include but not limited to complex migraine versus ocular migraine versus atypical migraine versus intercranial bleed versus close head injury versus nerve root injury disc injury.  No signs of cauda equina syndrome.  No loss of bowel or bladder control no numbness or tingling to the groin.  NIH 0 test of skew negative.  Sinusitis is also within the differential.  CT of the head showed no acute intracranial process.  Skull intact.  No pronounced vasogenic edema or other focal brain asymmetry to suggest large underlying mass lesion.  No acute fracture or subluxation of the cervical spine.  Patient was given a migraine cocktail here in the emergency department with total resolution of his headache.  He has no further numbness or tingling.  Suspect this is more radiculopathy versus acute complex migraine.  NIH negative test of skew negative patient was given referral to neurology.  Advised on supportive care measures at home.  He has a history of  migraine headaches.  This is not the worst headache of his life did not, make it better..  No intracranial bleed noted.  Follow-up with primary care physician.  Return with any worsening symptoms or concerns.  Patient is amenable to plan at this time.  Reports he is back to his normal.  There is been no reported fever.  No out of country travel.  No loss of bowel or bladder control.  No numbness or tingling to the groin.  Initial blood pressure and heart rate was elevated.  This resolved in the emergency department.  He denies any neck stiffness.  Currently has follow-up with his orthopedic surgeon for an MRI in a couple of days.  Patient and family are amendable to plan amenable to discharge  Patient seen independently attending physician available for consultation if needed  Impression headache history of migraines  Intermittent paresthesias resolved  Neck pain  CMT for discharge utilized for discharge planning    PROCEDURES  Unless otherwise noted below, none      CONSULTS:   None      ED Course as of 06/22/24 1933   Sat Jun 22, 2024   1812 Upon reevaluation.  Patient reports his headache is gone at this time.  Repeat vital signs improved.  Reports he still has neck discomfort but has an MRI scheduled.  No loss of bowel or bladder control no numbness or tingling to the groin.  Full range of motion of all extremities no numbness or tingling at this time.  Patient was given referral to neurology for history of migraines supportive care measures at home return with any worsening symptoms or concerns patient and family verbalized understanding amenable to discharge at this time [TB]      ED Course User Index  [TB] KEIRA Wade-CNP         Diagnoses as of 06/22/24 1933   Acute nonintractable headache, unspecified headache type   Arm paresthesia, left   History of closed head injury   Neck pain   Elevated blood pressure reading         This patient has remained hemodynamically stable during their ED  course.      Critical Care: None      Counseling:  The emergency provider has spoken with the patient family and discussed today’s results, in addition to providing specific details for the plan of care and counseling regarding the diagnosis and prognosis.  Questions are answered at this time and they are agreeable with the plan.         --------------------------------- IMPRESSION AND DISPOSITION ---------------------------------    IMPRESSION  1. Acute nonintractable headache, unspecified headache type    2. Arm paresthesia, left    3. History of closed head injury    4. Neck pain    5. Elevated blood pressure reading        DISPOSITION  Disposition: Discharge home  Patient condition is stable        NOTE: This report was transcribed using voice recognition software. Every effort was made to ensure accuracy; however, inadvertent computerized transcription errors may be present      KEIRA Wade-YAAKOV  06/22/24 1933

## 2024-06-22 NOTE — DISCHARGE INSTRUCTIONS
Follow-up with primary care physician in 2 days for reevaluation  Continue to follow-up with your orthopedic doctor as directed with MRI  Drink plenty of fluids  Go home and rest sit in a quiet room  Return with any worsening symptoms or concerns  Follow-up with neurology within 1 week for reevaluation of headaches close head injury  Today it was noted that your blood pressure was elevated follow-up with primary care physician for reevaluation.  Journal your blood pressure daily for your primary care physician.  Also journal your headaches triggers what makes them better or worse and food you ate the day of the headache.

## 2024-06-22 NOTE — Clinical Note
Miguel Valentin was seen and treated in our emergency department on 6/22/2024.  He may return to work on 06/24/2024.  1-3 days if needed      If you have any questions or concerns, please don't hesitate to call.      Candelaria Chauhan, APRN-CNP

## 2024-06-26 ENCOUNTER — HOSPITAL ENCOUNTER (OUTPATIENT)
Dept: RADIOLOGY | Facility: HOSPITAL | Age: 24
Discharge: HOME | End: 2024-06-26
Payer: COMMERCIAL

## 2024-06-26 DIAGNOSIS — M54.2 CERVICALGIA: ICD-10-CM

## 2024-06-26 DIAGNOSIS — M46.42 DISCITIS OF CERVICAL REGION: ICD-10-CM

## 2024-06-26 DIAGNOSIS — M62.838 CERVICAL PARASPINAL MUSCLE SPASM: ICD-10-CM

## 2024-06-26 DIAGNOSIS — S16.1XXA CERVICAL STRAIN, ACUTE, INITIAL ENCOUNTER: ICD-10-CM

## 2024-06-26 DIAGNOSIS — M54.12 LEFT CERVICAL RADICULOPATHY: ICD-10-CM

## 2024-06-26 PROCEDURE — 72141 MRI NECK SPINE W/O DYE: CPT

## 2024-07-02 ENCOUNTER — HOSPITAL ENCOUNTER (OUTPATIENT)
Dept: RADIOLOGY | Facility: HOSPITAL | Age: 24
Discharge: HOME | End: 2024-07-02
Payer: COMMERCIAL

## 2024-07-02 DIAGNOSIS — M75.22 BICEPS TENDINITIS, LEFT: ICD-10-CM

## 2024-07-02 DIAGNOSIS — S43.102A AC SEPARATION, LEFT, INITIAL ENCOUNTER: ICD-10-CM

## 2024-07-02 DIAGNOSIS — M25.512 ACUTE PAIN OF LEFT SHOULDER: ICD-10-CM

## 2024-07-02 DIAGNOSIS — M95.8 BILATERAL WINGED SCAPULA: ICD-10-CM

## 2024-07-02 DIAGNOSIS — S43.002A SHOULDER SUBLUXATION, LEFT, INITIAL ENCOUNTER: ICD-10-CM

## 2024-07-02 DIAGNOSIS — S49.92XA INJURY OF GLENOID LABRUM, LEFT, INITIAL ENCOUNTER: ICD-10-CM

## 2024-07-02 DIAGNOSIS — S46.012A ROTATOR CUFF STRAIN, LEFT, INITIAL ENCOUNTER: ICD-10-CM

## 2024-07-02 PROCEDURE — 73222 MRI JOINT UPR EXTREM W/DYE: CPT | Mod: LT

## 2024-07-02 PROCEDURE — 73222 MRI JOINT UPR EXTREM W/DYE: CPT | Mod: LEFT SIDE | Performed by: RADIOLOGY

## 2024-07-02 PROCEDURE — 77002 NEEDLE LOCALIZATION BY XRAY: CPT | Mod: LEFT SIDE | Performed by: RADIOLOGY

## 2024-07-02 PROCEDURE — 2500000005 HC RX 250 GENERAL PHARMACY W/O HCPCS: Performed by: FAMILY MEDICINE

## 2024-07-02 PROCEDURE — 2550000001 HC RX 255 CONTRASTS: Performed by: FAMILY MEDICINE

## 2024-07-02 PROCEDURE — A9575 INJ GADOTERATE MEGLUMI 0.1ML: HCPCS | Performed by: FAMILY MEDICINE

## 2024-07-02 PROCEDURE — 73040 CONTRAST X-RAY OF SHOULDER: CPT | Mod: LT

## 2024-07-02 PROCEDURE — 23350 INJECTION FOR SHOULDER X-RAY: CPT | Mod: LEFT SIDE | Performed by: RADIOLOGY

## 2024-07-02 RX ORDER — GADOTERATE MEGLUMINE 376.9 MG/ML
0.1 INJECTION INTRAVENOUS
Status: COMPLETED | OUTPATIENT
Start: 2024-07-02 | End: 2024-07-02

## 2024-07-02 RX ORDER — SODIUM CHLORIDE 0.9 % (FLUSH) 0.9 %
20 SYRINGE (ML) INJECTION ONCE
Status: COMPLETED | OUTPATIENT
Start: 2024-07-02 | End: 2024-07-02

## 2024-07-02 RX ORDER — LIDOCAINE HYDROCHLORIDE 10 MG/ML
5 INJECTION, SOLUTION EPIDURAL; INFILTRATION; INTRACAUDAL; PERINEURAL ONCE
Status: COMPLETED | OUTPATIENT
Start: 2024-07-02 | End: 2024-07-02

## 2024-07-02 NOTE — POST-PROCEDURE NOTE
Interventional Radiology Brief Postprocedure Note    Attending: Rajinder Conway DO    Assistant:   Staff Role   No Staff Documented       Diagnosis:   1. Acute pain of left shoulder  XR arthrogram shoulder left    XR arthrogram shoulder left      2. Rotator cuff strain, left, initial encounter  XR arthrogram shoulder left    XR arthrogram shoulder left      3. Biceps tendinitis, left  XR arthrogram shoulder left    XR arthrogram shoulder left      4. Injury of glenoid labrum, left, initial encounter  XR arthrogram shoulder left    XR arthrogram shoulder left      5. AC separation, left, initial encounter  XR arthrogram shoulder left    XR arthrogram shoulder left      6. Shoulder subluxation, left, initial encounter  XR arthrogram shoulder left    XR arthrogram shoulder left      7. Bilateral winged scapula  XR arthrogram shoulder left    XR arthrogram shoulder left          Description of procedure: XR arthrogram shoulder left    Timeout:  Yes    Procedure Area: Procedure Area     Anesthesia:   Local/Topical    Complications: None    Estimated Blood Loss: none    Medications (Filter: Administrations occurring from 1516 to 1516 on 07/02/24) As of 07/02/24 1516      None          No specimens collected      See detailed result report with images in PACS.    The patient tolerated the procedure well without incident or complication and is in stable condition.

## 2024-07-18 ENCOUNTER — APPOINTMENT (OUTPATIENT)
Dept: RADIOLOGY | Facility: HOSPITAL | Age: 24
End: 2024-07-18
Payer: COMMERCIAL

## 2024-08-05 ENCOUNTER — OFFICE VISIT (OUTPATIENT)
Dept: PRIMARY CARE | Facility: CLINIC | Age: 24
End: 2024-08-05
Payer: COMMERCIAL

## 2024-08-05 ENCOUNTER — OFFICE VISIT (OUTPATIENT)
Dept: SPORTS MEDICINE | Facility: CLINIC | Age: 24
End: 2024-08-05
Payer: COMMERCIAL

## 2024-08-05 ENCOUNTER — HOSPITAL ENCOUNTER (OUTPATIENT)
Dept: RADIOLOGY | Facility: CLINIC | Age: 24
Discharge: HOME | End: 2024-08-05
Payer: COMMERCIAL

## 2024-08-05 VITALS
HEART RATE: 91 BPM | RESPIRATION RATE: 16 BRPM | SYSTOLIC BLOOD PRESSURE: 110 MMHG | BODY MASS INDEX: 26.34 KG/M2 | TEMPERATURE: 97.7 F | OXYGEN SATURATION: 96 % | WEIGHT: 184 LBS | HEIGHT: 70 IN | DIASTOLIC BLOOD PRESSURE: 58 MMHG

## 2024-08-05 VITALS
DIASTOLIC BLOOD PRESSURE: 72 MMHG | BODY MASS INDEX: 25.77 KG/M2 | SYSTOLIC BLOOD PRESSURE: 114 MMHG | HEIGHT: 70 IN | HEART RATE: 68 BPM | WEIGHT: 180 LBS

## 2024-08-05 DIAGNOSIS — S43.002D SHOULDER SUBLUXATION, LEFT, SUBSEQUENT ENCOUNTER: ICD-10-CM

## 2024-08-05 DIAGNOSIS — M54.2 CERVICALGIA: ICD-10-CM

## 2024-08-05 DIAGNOSIS — M62.838 CERVICAL PARASPINAL MUSCLE SPASM: ICD-10-CM

## 2024-08-05 DIAGNOSIS — S63.635A SPRAIN OF INTERPHALANGEAL JOINT OF LEFT RING FINGER, INITIAL ENCOUNTER: ICD-10-CM

## 2024-08-05 DIAGNOSIS — S16.1XXD CERVICAL STRAIN, SUBSEQUENT ENCOUNTER: ICD-10-CM

## 2024-08-05 DIAGNOSIS — M51.26 HERNIATED LUMBAR DISC WITHOUT MYELOPATHY: ICD-10-CM

## 2024-08-05 DIAGNOSIS — M75.22 BICEPS TENDINITIS, LEFT: ICD-10-CM

## 2024-08-05 DIAGNOSIS — M95.8 BILATERAL WINGED SCAPULA: ICD-10-CM

## 2024-08-05 DIAGNOSIS — S43.102D AC SEPARATION, LEFT, SUBSEQUENT ENCOUNTER: ICD-10-CM

## 2024-08-05 DIAGNOSIS — Z00.00 ROUTINE GENERAL MEDICAL EXAMINATION AT A HEALTH CARE FACILITY: Primary | ICD-10-CM

## 2024-08-05 DIAGNOSIS — M25.512 ACUTE PAIN OF LEFT SHOULDER: ICD-10-CM

## 2024-08-05 DIAGNOSIS — S49.92XD INJURY OF GLENOID LABRUM, LEFT, SUBSEQUENT ENCOUNTER: ICD-10-CM

## 2024-08-05 DIAGNOSIS — S46.012D ROTATOR CUFF STRAIN, LEFT, SUBSEQUENT ENCOUNTER: ICD-10-CM

## 2024-08-05 DIAGNOSIS — M46.42 DISCITIS OF CERVICAL REGION: ICD-10-CM

## 2024-08-05 DIAGNOSIS — M54.12 LEFT CERVICAL RADICULOPATHY: ICD-10-CM

## 2024-08-05 PROCEDURE — 73140 X-RAY EXAM OF FINGER(S): CPT | Mod: LEFT SIDE | Performed by: RADIOLOGY

## 2024-08-05 PROCEDURE — 3008F BODY MASS INDEX DOCD: CPT | Performed by: FAMILY MEDICINE

## 2024-08-05 PROCEDURE — 73140 X-RAY EXAM OF FINGER(S): CPT | Mod: LT

## 2024-08-05 PROCEDURE — 1036F TOBACCO NON-USER: CPT | Performed by: FAMILY MEDICINE

## 2024-08-05 PROCEDURE — 99203 OFFICE O/P NEW LOW 30 MIN: CPT | Performed by: FAMILY MEDICINE

## 2024-08-05 PROCEDURE — 99214 OFFICE O/P EST MOD 30 MIN: CPT | Performed by: FAMILY MEDICINE

## 2024-08-05 PROCEDURE — 99385 PREV VISIT NEW AGE 18-39: CPT | Performed by: FAMILY MEDICINE

## 2024-08-05 ASSESSMENT — ENCOUNTER SYMPTOMS
NEUROLOGICAL NEGATIVE: 1
DEPRESSION: 0
RESPIRATORY NEGATIVE: 1
GASTROINTESTINAL NEGATIVE: 1
CONSTITUTIONAL NEGATIVE: 1
MUSCULOSKELETAL NEGATIVE: 1
CARDIOVASCULAR NEGATIVE: 1
OCCASIONAL FEELINGS OF UNSTEADINESS: 0
LOSS OF SENSATION IN FEET: 0

## 2024-08-05 ASSESSMENT — PAIN SCALES - GENERAL
PAINLEVEL_OUTOF10: 4
PAINLEVEL: 4
PAINLEVEL: 4

## 2024-08-05 ASSESSMENT — PATIENT HEALTH QUESTIONNAIRE - PHQ9
1. LITTLE INTEREST OR PLEASURE IN DOING THINGS: NOT AT ALL
SUM OF ALL RESPONSES TO PHQ9 QUESTIONS 1 AND 2: 0
2. FEELING DOWN, DEPRESSED OR HOPELESS: NOT AT ALL

## 2024-08-05 ASSESSMENT — PAIN - FUNCTIONAL ASSESSMENT: PAIN_FUNCTIONAL_ASSESSMENT: 0-10

## 2024-08-05 NOTE — PROGRESS NOTES
"Subjective   Patient ID: Miguel Valentin is a 23 y.o. male who presents for New Patient Visit (Pt is here to establish care and is not fasting.).    HPI   Chf in family.  Great uncle with diabetes.  Works as a  for [employer],  nonsmoker etoh only occasionally.   Pain over PIP of left 4th finger after getting hit with football yesterday.    Review of Systems   Constitutional: Negative.    HENT: Negative.     Respiratory: Negative.     Cardiovascular: Negative.    Gastrointestinal: Negative.    Genitourinary: Negative.    Musculoskeletal: Negative.    Neurological: Negative.        Objective   /58 (BP Location: Left arm, Patient Position: Sitting, BP Cuff Size: Adult)   Pulse 91   Temp 36.5 °C (97.7 °F) (Temporal)   Resp 16   Ht 1.778 m (5' 10\")   Wt 83.5 kg (184 lb)   SpO2 96%   BMI 26.40 kg/m²     Physical Exam  Vitals and nursing note reviewed.   Constitutional:       General: He is not in acute distress.  HENT:      Right Ear: Tympanic membrane and ear canal normal.      Left Ear: Tympanic membrane and ear canal normal.      Nose: Nose normal. No rhinorrhea.      Mouth/Throat:      Pharynx: Oropharynx is clear. No oropharyngeal exudate or posterior oropharyngeal erythema.      Comments: Dentition wnl  Eyes:      Extraocular Movements: Extraocular movements intact.      Conjunctiva/sclera: Conjunctivae normal.      Pupils: Pupils are equal, round, and reactive to light.   Neck:      Vascular: No carotid bruit.   Cardiovascular:      Rate and Rhythm: Normal rate and regular rhythm.      Heart sounds: Normal heart sounds. No murmur heard.  Pulmonary:      Breath sounds: Normal breath sounds. No wheezing or rhonchi.   Abdominal:      General: Bowel sounds are normal. There is no distension.      Palpations: Abdomen is soft. There is no mass.      Tenderness: There is no abdominal tenderness. There is no guarding or rebound.      Hernia: No hernia is present.   Musculoskeletal:         " General: No swelling or tenderness. Normal range of motion.      Cervical back: Normal range of motion and neck supple.      Comments: Left 4th finger with some swelling and tenderness over PIP.  Some mild ecchymosis of volar surface.  Mild decreased rom of PIP due to swelling.  NT DIP and good rom.    Lymphadenopathy:      Cervical: No cervical adenopathy.   Skin:     General: Skin is warm.      Findings: No rash.   Neurological:      General: No focal deficit present.      Mental Status: He is alert.         Assessment/Plan   Problem List Items Addressed This Visit    None  Visit Diagnoses         Codes    Routine general medical examination at a health care facility    -  Primary Z00.00    Relevant Orders    CBC and Auto Differential    Comprehensive Metabolic Panel    TSH with reflex to Free T4 if abnormal    Lipid Panel    Sprain of interphalangeal joint of left ring finger, initial encounter     S66.338A    Relevant Orders    XR fingers left 2+ views

## 2024-08-05 NOTE — PATIENT INSTRUCTIONS
May continue to alternate moist heat and ice as needed    Again Reviewed rotator cuff injury and/or labral injury in detail with the patient to the level of their understanding at the time of this office visit.   Start Physical Therapy 1-2 times a week for 8-10 weeks with manual therapy as well as dry needling, cervical traction, and IASTM    Reviewed home exercises to be performed by the patient routinely, including wall crawls, circumduction exercises, resistance band exercises, as well as additional exercises from rotator cuff and/or labral    Will recommend at future visit: over-the-counter calcium with vitamin-D 2 -3000+ milligrams a day, as well as OTC symphytum as directed daily to promote bony healing, in addition to a daily multivitamin.  ,  Will recommend at future visit:  over-the-counter curcumin, turmeric, boswellia, as well as egg shell membrane as directed to aid with joint inflammation.    Will recommend at future visit:  over-the-counter Move Free for joint health.    The patient may alternate with OTC Tylenol Extra Strength or OTC Tylenol Arthritis, taking one every 6-8 hours with food as needed.   Patient advised regarding the risks and/or potential adverse reactions and/or side effects of any prescribed medications along with any over-the-counter medications or any supplements used. Patient advised to seek immediate medical care if any adverse reactions occur. The patient and/or patient(s) parent(s) verbalized their understanding.    Discussed in detail with the patient to the level of their understanding the possibility in the future of regenerative injections versus corticosteroid injections  ,  Reviewed LEFT SHOULDER MRI in detail with the patient and/or patients parent/legal guardian to their level of understanding; a copy of these results were provided to the patient and/or patients parent/legal guardian at the time of this office visit.   Reviewed CERVICAL SPINE MRI in detail with the  patient and/or patients parent/legal guardian to their level of understanding; a copy of these results were provided to the patient and/or patients parent/legal guardian at the time of this office visit.   Follow up 2-3 months or sooner if needed.  [phone] to schedule PT

## 2024-08-05 NOTE — PROGRESS NOTES
Verbal consent of the patient and/or verbal parental consent for patients under the age of 18 have been obtained to conduct a physical examination at this office visit.    Established patient  History Of Present Illness  08/05/24 Miguel Valentin is a 23 y.o. male who presents for his MRI follow up of their Left Shoulder and CERVICAL spine. Dani states that he is feeling better since he was last seen. He states that he is still having 4/10 pain when it spikes. He states that recently he hiked around [facility] with a backpack full of water bottles and responded well. He has not yet scheduled PT. He states that during left shoulder flexion, elevation, and rotation he feels popping that causes mild discomfort. He states that his method of pain management at the moment is rest and stretches. He states that he was having migraines recently and feels that the neck and shoulder injury might contribute to that. He denies numbness tingling pins or needles.   I told him based off of clinically how he is doing and his MRI results being negative for his left shoulder and his cervical spine most of his issues most likely related to his posture and stress causing some of these muscle spasms especially with him working 12 to 15 hours a day for 6 days a week.  We went through proper mechanics and what he needs to do to help this and how he can modify his activities for work.    I also stressed to him the importance of getting into physical therapy as soon as possible and figuring out how to make time.    All previous Progress Notes and imaging results related to this patients chief complaint have been reviewed in preparation for this examination.    Past Medical History  He has a past medical history of Migraines, Multicystic kidney disease, and Other specified health status.    Surgical History  He has a past surgical history that includes Nephrectomy (Left).     Social History  He reports that he has never smoked. He has never  used smokeless tobacco. He reports current alcohol use. He reports that he does not use drugs.    Family History  Family History   Problem Relation Name Age of Onset    No Known Problems Mother      Kidney disease Father          Allergies  Tree nuts    Historical Clinical Intake:  06/10/24 Miguel Valentin is a 23 y.o. male who presents for an evaluation of their Left Shoulder and CERVICAL spine. 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 He states that he was performing pushups when he suddenly felt his LEFT arm collapse where he subsequently blacked out and could not see out of his LEFT eye. The pain he experienced was posterior in his shoulder and went up into his cervical spine. At that time he consulted with the medical staff at the [facility] who did not perform any imaging and prescribed him prednisone for 3 weeks. His symptoms resolved and he had been doing well until 3 weeks ago on 5/20/24 when he fell asleep standing in the shower after working many hours as a  and collided his LEFT shoulder with the wall while side bending his CERVICAL spine to the RIGHT. He once again has similar pain to the posterior aspect of his shoulder up his trapezius and into the LEFT side of his CERVICAL spine. He feels that the muscles of the area are rather tight. The pain is worsened with neck extension where he gets shooting pain into his shoulder.  He has been doing physical therapy however it has been making him worse so he was sent into the office for further evaluation and workup since it has not been getting any better and his symptoms are getting worse for physical therapy.  He denies any radiating symptoms but does have some clicking and worsening of pain with OH movements. Miguel reports a feeling of 'restlessness' down his LEFT arm. The pain presently is 3/10 but will reach 7/10 with  movement. He did seek treatment at the [facility] on 5/27/24; however, the facility did not have any power and therefore he did not receive any imaging. He was prescribed cyclobenzaprine which he has taken a few times when he is not working in addition to Aleve, tylenol and magnesium   However however today did not seem to be helping his pain.  He is unable to take NSAIDs because of only having 1 kidney..     Review of Systems  CONSTITUTIONAL:   Negative for weight change, loss of appetite, fatigue, weakness, fever, chills, night sweats, headaches .           HEENT:   Negative for cold, cough, sore throat, sinus pain, swollen lymph nodes.           OPHTHALMOLOGY:   Negative for diminished vision, blurred vision, loss of vision, double vision.           ALLERGY:   Negative for runny nose, scratchy throat, sinus congestion, rash, facial pressure, nasal congestion, post-nasal drip.           CARDIOLOGY:   Negative for chest pain, palpitations, murmurs, irregular heart beat, shortness of breath, leg edema, dyspnea on exertion, fatigue, dizziness.           RESPIRATORY:   Negative for chest pain, shortness of breath, swelling of the legs, asthma/copd, chest congestion, pain with breathing .           GASTROENTEROLOGY:   Negative for nausea, vomitting, heartburn, constipation, diarrhea, blood in stool, change in bowel habits, black stool.           HEMATOLOGY/LYMPH:   Negative for fatigue, loss of appetitie, easy bruising, easy bleeding, anemia, abnormal bleeding, slow healing.           ENDOCRINOLOGY:   Negative for polyuria, polydipsia, polyphagia, fatigue, weight loss, weight gain, cold intolerance, heat intolerance, diabetes.           MUSCULOSKELETAL:   Positive  for Left Shoulder pain and Cervical Spine pain         DERMATOLOGY:   Negative for rash, bruising.           NEUROLOGY:   Negative for tingling, numbness, gait abnormality, paresthesias, weakness, sciatica.        General Examination:  GENERAL APPEARANCE:  Appears well, pleasant, and cooperative, NAD.   HEENT: Normal, unremarkable.   NECK: Supple.   HEART: RRR, normal S1S2.   LUNGS: Clear to auscultation bilaterally.   ABDOMEN: Soft, NT/ND, BS present.   EXTREMITIES: No cyanosis, clubbing.   SKIN: No rash or cellulitis.   NEUROLOGIC EXAM: Awake, A&O x 3, CN's II-XII grossly intact.   PSYCH: Good eye contact, appropriate mood and affect     Examination:    Findings improved since last visit  CERVICAL   Erythema: Negative.   Edema: Negative.   Effusion: Negative.   TART Findings: Positive:  Tissue Texture Changes, Asymmetry, Restriction, Tenderness. Paraspinal Muscle spasms C2-C7,, Upper Trapezius muscle spasm LEFT   Warmth: Negative.   Ecchymosis/Bruising: Negative.   Percussion Test (CERVICAL): Negative.   Tuning Fork Test (CERVICAL): Negative.   Percussion Test (THORACIC):  Negative.   Tuning Fork Test (THORACIC):  Negative.   Abrasions: Negative.     Orientation (CERVICAL): Positive:   Decreased cervical lordosis because of muscle spasms  Findings improved since last visit  Orientation (THORACIC):  Normal.     ROM (CERVICAL):Positive:     Forward Flexion, Extension Normal  Decreased LEFT>RIGHT  Lateral Bending (Side Bending).   Decreased Rotation LEFT > RIGHT     ROM (THORACIC):   FROM, Flexion, Extension, Rotation, and Side Bending.     Muscle Strength:   Negative: Cervical Flexion, Extension, Left Rotation, Right Rotation, Left Lateral Flexion, Right Lateral Flexion, Trapezius (Shrug), Anterior Deltoid, Posterior Deltoid and Lateral Deltoid.          DTR/Neurological: Symmetrical  +2/+4 Biceps Reflex (C5)  +2/+4 Brachioradialis Reflex (C6)  +2/+4 Triceps Reflex (C7).     Sensation/Neurological Cervical:   C2: Lower jaw and back of head  C3: Upper neck and back of head  C4: Lower neck, upper shoulders, and upper chest  C5: Area of collarbones, lateral upper arms, and upper chest  C6: Lateral forearms, thumbs, anterior index finger, and lateral half of the middle  finger  C7: Some of posterior index finger, medial half of middle finger, upper posterior back, and back of arms  C8: Ring finger, little finger, medial forearm, and posterior upper back.     Sensation/Neurological Thoracic:   Negative, Symmetrical; 2-Point Discrimination Test: Negative  T1: Medial anterior upper arm, axilla, upper chest and posterior back  T2: Upper chest and posterior back  T3: Upper chest and posterior back  T4: Upper chest (nipple area) and posterior back  T5: Mid chest and posterior back  T6: Mid chest and posterior back  T7: Mid chest and posterior back  T8: Upper abdomen and middle posterior back  T9: Upper abdomen and middle posterior back  T10: Abdomen (area of belly button) and middle posterior back  T11: Abdomen and middle posterior back  T12: Lower abdomen and middle posterior back.     Palpation:  Findings improved since last visit  Positive:  still some mild Tenderness to Palpation C2-C7 articular pillars, palpable upper trapezius and cervical paraspinal muscle spasms LEFT    Vascular:   +2/+4,Carotid  +2/+4 Radial  Capillary Refill< 2 seconds.     Cervicle Spine Tests: Findings improved since last visit  Lhermitte's Sign: Negative.   Spurling's Test (Atlanto-Axial Compression Test): negative  Reverse Spurling's Test: negative  Adson's Test: Negative.   Wilmer's Test: Negative.   Parnell's Test: Negative.   Costoclavicular Test: Negative.   Cervical Spine Distraction Test: Negative.   Tinel's Sign: Negative.   Brachial Plexus Tension Test: Negative.   Brachial Plexus Stretch Test: Negative.   Neutral Axial Compression Test: Negative.   Shoulder ABduction (Bakody) Test: Negative.   Cervical Compression with Max Foraminal Compression Test: negative  Intervertebral Foramina Compression Test: Negative.   Flexion Compression Test: Negative.   Extension Compression Test: Negative.   Maximum Compression of the Intervertebral Foramina Test: negative  Forward Flexion Test: Negative.   Swallowing  Test: Negative.   Percussion Test: Negative.   Parada-Eckert Test: Negative.   O'Bull Test: Negative.   Shoulder Press Test: Negative.   Alexi Compression Test: Negative.   Rotation Compression Test: Negative.   Side Bending Compression Test: Negative.   Thoracic Outlet Test: Negative.   Vertebral Artery Test: Negative.   Valsalva Maneuver Test: Negative.         Thoracic Spine Tests:  Pate Forward Bend Test: Negative.   Brudzinski's/Kernig's Test: Negative.   Slump Test: Negative.   First Thoracic Neve Root Test: Negative.   Passive Scapular Approximation: Negative.   Scapular Elevation Test: Negative.   Scapular Retraction Test: Negative.   Scapular Protraction (Winging): Negative.   Costoclavicular Test: Negative.   Elevated Arms Stress Test (Cherise Test): Negative.   Kyphosis Test on Hands and Knees: Negative.   Segmental Function in the Thoracic Spine in Extension Test: Negative.         Chest Tests:  Sternum Compression Test: Negative.   Costosternal Pressure Test: Negative.   Rib Compression Test: Negative.   Schepelmann Test: Negative.   Pectoralis Major Contracture Test: Negative.   Chest Circumference Test: Negative.          Examination:  Left Shoulder     Erythema: Negative.   Edema: Negative.   Effusion: Negative.   Warmth: Negative.   Ecchymosis/Bruising: Negative.   Percussion Test: Negative.   Tuning Fork Test: Negative.   Abrasions: Negative.   Orientation: Symmetrical.            ROM:   Positive:  some minimal pain  Forward Flexion (0-180 degrees)  Extension (0-60 degrees)  ABduction (0-180 degrees)  ADduction (30-50 degrees)  External Rotation with elbow at side (0-90 degrees)  Internal Rotation with elbow at side (0-70 degrees)  Horizontal ABduction (0-90 degrees)  Horizontal ADduction (0-45 degrees)  External Rotation with elbow at 90 degrees of ABduction [0-() degrees]          Internal Rotation with elbow at 90 degrees of ABduction [0-(70-90) degrees]  Positive Apley's Shoulder Scratch  Test Inferiorly Tests a Combination of: Extension, Internal Rotation, and Scapular Adduction  LEFT arm reaches: T2  RIGHT arm reaches: T3    Muscle Strength:   +5/+5: Internal Rotation - subscapularis  +5/+5: External Rotation - infraspinatus and teres minor  +5/+5: ABduction - supraspinatus and deltoid  +5/+5: ABduction with thumbs down and 30 degrees horizontal ADduction - supraspinatus  +5/+5: Palms up with elbow bent to 15 degrees flexion and resisted upward motion - biceps  +5/+5: Simultaneous resisted supination and elbow flexion- biceps  +5/+5: Flexion  +5/+5: Extension  +5/+5: ABduction  +5/+5: ADduction  +5/+5: Internal Rotation at 90 Degrees  +5/+5: External Rotation at 90 Degrees         +5/+5: Internal Rotation at 0 Degrees  +5/+5: External Rotation at 0 Degrees  +5/+5: Apley's Shoulder Scratch Test Superiorly Tests a Combination of: Flexion, External Rotation, and Scapular ABduction  +5/+5:Apley's Shoulder Scratch Test Inferiorly Tests a Combination of: Extension, Internal Rotation, and Scapular ADduction  +5/+5: Triceps  +5/+5: Biceps            DTR/Neurological:   Negative, Symmetrical:  +2/+4 DTR's: Biceps Brachi (C5)  +2/+4 DTR's: Brachioradialis (C6)  +2/+4 DTR's: Triceps (C7).         Sensation/Neurological:   C2: Lower jaw and back of head  C3: Upper neck and back of head  C4: Lower neck, upper shoulders and upper chest  C5: Area of collarbones, lateral upper arms and upper chest  C6: Lateral forearms, thumbs, anterior index finger and lateral half of the middle finger  C7: Some of posterior index finger, medial half of middle finger, upper posterior back, and back of arms  C8: Ring finger, little finger, medial forearm and posterior upper back  T1: Medial anterior upper arm, axilla, upper chest, and posterior back            Palpation: Positive:   still some minimal Tenderness to Palpation Biceps long head tendon, RTC interval  Findings improved since last visit            Vascular:   Capillary  "Refill less than 2 seconds , Negative, Symmetrical:  +2/+4: Carotid pulse  +2/+4: Radial pulse  +2/+4: Ulnar pulse  +2/+4: Brachial pulse.            Winging Scapula: Positive: BILATERAL.        Shoulder - AC Joint:  Painful Arc 120-180 degrees:  Negative.   AC Compression Test:  Negative.   Cross Body ADduction Stress Test:  Negative.   Piano Key Sign:  Negative.   AC Distraction Test:  Negative.            Shoulder - Biceps: Findings improved since last visit  Abbott-Gil Test: Negative.   Speeds Test: Positive:   Yergason Test: Negative.            Shoulder - Impingement: Findings improved since last visit  Neer Test:  Negative.   Hawken-Morgan Test:  Negative.   Painful Arc  degrees:  Negative.            Shoulder - Infraspinatus:  Resisted ER at 0 degrees ABduction:  Negative with arm at side.            Shoulder - Labrum/Instability: Findings improved since last visit  Anterior Apprehension Test:  Positive:  pain relieved with anterior and posterior pressure  Anterior Release/Surprise Test:  Negative.  Anterior Drawer Test:  Negative.   Bicep Flexion at 90 degrees ABduction Test:  Negative.   Bicipital Load Test:  Negative.   Posterior Apprehension Test:  Positive:  pain relieved with anterior and posterior pressure  Relocation Test:  Negative.   Sulcus Sign:  Negative.   Silva Test:  Negative.   Anterior Slide Test:  Negative.   Clunk Test:  Negative.   Grind Test:  Positive: mild discomfort  Mobile Test:  Negative.   Crank Test:  Negative.   Load and Shift Test:  Negative.       Horizontal Adduction Thumb Down: Negative  Shoulder - Subscapularis:  Bear Hug Test:  Negative.   Lift Off Test:  Negative.   Rochester Test:  Negative.   Resisted Elbow Push Down Test:  Negative.   Resisted IR at 90 degrees:  Negative.   Resisted Lift Off \"\" Test:  Negative.         Shoulder - Supraspinatus:  Full Can Test: Negative.  Empty Can Test: Negative.  Resisted Elbow Push Up Test: Negative.   Drop Arm " Test: Negative.   Ashley Test: Negative.   Lateral Ashley Test: Negative.   Zero Degree ABduction Test: Negative.   Painful Arc  degrees: Negative  degrees.            Shoulder - Teres Minor:  Resisted ER at 90 degrees ABduction:  Negative.            Shoulder - Vascular Thoracic Outlet Syndrome:  Cherise Test: Negative.   Adson's Test: Negative.   Wilmer's Test: Negative.   Parnell Test: Negative.   Vilma Test: Negative.         Imaging and Diagnostics Review:  Interpreted By:  Meri Fregoso,   STUDY: MR CERVICAL SPINE WO IV CONTRAST      INDICATION: Signs/Symptoms:Cervicalgia, discitis, cervical strain, paraspinal muscle      COMPARISON: CT cervical spine 06/22/2024.      ACCESSION NUMBER(S): [id number]      ORDERING CLINICIAN: WAYNE ARREGUIN      TECHNIQUE:  Multiplanar multisequence MR imaging of the cervical spine was performed without the administration of intravenous contrast, according to standard protocol.      FINDINGS:  ALIGNMENT: The alignment is normal.      VERTEBRAE: The vertebral bodies are normal in height. There is no  fracture or aggressive osseous lesion.      DISCS: The disc spaces are maintained.      CORD: There is no intrinsic spinal cord signal abnormality.      PARAVERTEBRAL SOFT TISSUES: The visualized paravertebral soft tissues appear within normal limits.      EVALUATION OF INDIVIDUAL LEVELS:  C2-3: No disc herniation  spinal canal or neuroforaminal stenosis.      C3-4: No disc herniation  spinal canal or neuroforaminal stenosis.      C4-5: No disc herniation  spinal canal or neuroforaminal stenosis.      C5-6: No disc herniation  spinal canal or neuroforaminal stenosis.      C6-7: No disc herniation  spinal canal or neuroforaminal stenosis.      C7-T1: No disc herniation  spinal canal or neuroforaminal stenosis.      IMPRESSION:MR CERVICAL SPINE WO IV CONTRAST 6/26/24  Unremarkable MRI of the cervical spine. No high-grade canal or foraminal stenosis at any level.      Signed by:  Meri Gargak 6/27/2024 12:12 AM  Dictation workstation:   [id number]  ----------------------------  Interpreted By:  Chacorta Self,   STUDY: MR arthrogram of the left shoulder; 7/2/2024 4:18 pm      INDICATION:  Signs/Symptoms:LEFT shoulder pain, labrum injury, RTC strain, AC separation, subluxation. Possiblity of RTC tear vs labrum tear vs  tendon tear. [facility] radiologist to read please..      COMPARISON: None      ACCESSION NUMBER(S): [id number]      ORDERING CLINICIAN: WAYNE ARREGUIN      TECHNIQUE: MR imaging of the left shoulder was obtained following the intra-articular administration of dilute gadolinium contrast material.      FINDINGS:  ROTATOR CUFF TENDONS:  The supraspinatus, infraspinatus, and teres minor tendons are intact.  There is no edema or fatty atrophy of the associated rotator cuff musculature. Evaluation of the subscapularis tendon and muscle is limited due to anterior arthrographic approach, but no gross abnormality is seen.      BICEPS TENDON AND ROTATOR INTERVAL:  The intra-articular long head biceps tendon is intact and has a normal course. The rotator interval is unremarkable.      JOINTS/LABRUM:  Contrast is present within the glenohumeral joint.  There is no evidence of bursal communication of contrast material to indicate full-thickness rotator cuff tear.      The glenoid labrum is intact without evidence of tear. There is no detachment. There is no paralabral cyst. Evaluation of the glenohumeral articulation demonstrates no articular cartilage defects.      The middle and inferior glenohumeral ligaments are intact. The superior glenohumeral ligament is grossly intact.      The acromioclavicular joint is unremarkable.      OSSEOUS STRUCTURES:  No focal marrow replacing lesions are identified. There is no fracture.      SOFT TISSUES:  Contrast material is present within the anterior soft tissues of the shoulder related to anterior arthrographic approach. There is no muscle atrophy or  tear.      IMPRESSION:  Normal MR arthrogram of the left shoulder. No evidence of a labral tear.      I personally reviewed the images/study and I agree with the findings as stated. This study was interpreted at [facility], [place], [place].      MACRO: None      Signed by: Chacorta Self 7/3/2024 6:00 AM  Dictation workstation:   [id number]      Assessment   1. Cervicalgia        2. Cervical paraspinal muscle spasm        3. Cervical strain, subsequent encounter        4. Discitis of cervical region        5. Acute pain of left shoulder        6. Rotator cuff strain, left, subsequent encounter        7. Biceps tendinitis, left        8. Injury of glenoid labrum, left, subsequent encounter        9. AC separation, left, subsequent encounter        10. Shoulder subluxation, left, subsequent encounter        11. Left cervical radiculopathy        12. Bilateral winged scapula        13. Herniated lumbar disc without myelopathy              Treatment or Intervention:  May continue to alternate moist heat and ice as needed    Again Reviewed rotator cuff  impingement and bicep tendinitis/tendinosis in detail with the patient to the level of their understanding at the time of this office visit.   Stressed the importance of Physical Therapy 1-2 times a week for 8-10 weeks with manual therapy as well as dry needling, cervical traction, and IASTM    Reviewed home exercises to be performed by the patient routinely, including wall crawls, circumduction exercises, resistance band exercises, as well as additional exercises from rotator cuff and/or labral     recommend: over-the-counter calcium with vitamin-D 2 -3000+ milligrams a day, as well as  a daily multivitamin.  ,   recommend:  over-the-counter curcumin, turmeric, boswellia, as well as egg shell membrane as directed to aid with joint inflammation.     recommend:  over-the-counter Move Free for joint health.    The patient may alternate   Advil liquid gels 1-2 every 8 hours with food with OTC Tylenol Extra Strength or OTC Tylenol Arthritis, taking one every 6-8 hours with food as needed.   Patient advised regarding the risks and/or potential adverse reactions and/or side effects of any prescribed medications along with any over-the-counter medications or any supplements used. Patient advised to seek immediate medical care if any adverse reactions occur. The patient and/or patient(s) parent(s) verbalized their understanding.    Discussed in detail with the patient to the level of their understanding the possibility in the future of regenerative injections versus corticosteroid injections  ,  Reviewed LEFT SHOULDER MRI in detail with the patient and/or patients parent/legal guardian to their level of understanding; a copy of these results were provided to the patient and/or patients parent/legal guardian at the time of this office visit.   Reviewed CERVICAL SPINE MRI in detail with the patient and/or patients parent/legal guardian to their level of understanding; a copy of these results were provided to the patient and/or patients parent/legal guardian at the time of this office visit.   Follow up 2-3 months or sooner if needed.    Please note that this report has been produced using speech recognition software.  It may contain errors related to grammar, punctuation or spelling.  Electronically signed, but not reviewed.  REX Hargrove, Director of Sports Medicine      WAYNE ARREGUIN on 8/5/24 at 2:29 PM.     CIARA Hargrove DO

## 2024-09-23 ENCOUNTER — LAB (OUTPATIENT)
Dept: LAB | Facility: LAB | Age: 24
End: 2024-09-23
Payer: COMMERCIAL

## 2024-09-23 DIAGNOSIS — Z00.00 ROUTINE GENERAL MEDICAL EXAMINATION AT A HEALTH CARE FACILITY: ICD-10-CM

## 2024-09-23 LAB
ALBUMIN SERPL BCP-MCNC: 4.8 G/DL (ref 3.4–5)
ALP SERPL-CCNC: 62 U/L (ref 33–120)
ALT SERPL W P-5'-P-CCNC: 25 U/L (ref 10–52)
ANION GAP SERPL CALC-SCNC: 12 MMOL/L (ref 10–20)
AST SERPL W P-5'-P-CCNC: 17 U/L (ref 9–39)
BASOPHILS # BLD AUTO: 0.02 X10*3/UL (ref 0–0.1)
BASOPHILS NFR BLD AUTO: 0.4 %
BILIRUB SERPL-MCNC: 0.7 MG/DL (ref 0–1.2)
BUN SERPL-MCNC: 8 MG/DL (ref 6–23)
CALCIUM SERPL-MCNC: 9.7 MG/DL (ref 8.6–10.3)
CHLORIDE SERPL-SCNC: 103 MMOL/L (ref 98–107)
CHOLEST SERPL-MCNC: 218 MG/DL (ref 0–199)
CHOLESTEROL/HDL RATIO: 6.1
CO2 SERPL-SCNC: 31 MMOL/L (ref 21–32)
CREAT SERPL-MCNC: 1 MG/DL (ref 0.5–1.3)
EGFRCR SERPLBLD CKD-EPI 2021: >90 ML/MIN/1.73M*2
EOSINOPHIL # BLD AUTO: 0.07 X10*3/UL (ref 0–0.7)
EOSINOPHIL NFR BLD AUTO: 1.3 %
ERYTHROCYTE [DISTWIDTH] IN BLOOD BY AUTOMATED COUNT: 11.9 % (ref 11.5–14.5)
GLUCOSE SERPL-MCNC: 79 MG/DL (ref 74–99)
HCT VFR BLD AUTO: 49.4 % (ref 41–52)
HDLC SERPL-MCNC: 36 MG/DL
HGB BLD-MCNC: 16.7 G/DL (ref 13.5–17.5)
IMM GRANULOCYTES # BLD AUTO: 0 X10*3/UL (ref 0–0.7)
IMM GRANULOCYTES NFR BLD AUTO: 0 % (ref 0–0.9)
LDLC SERPL CALC-MCNC: 145 MG/DL
LYMPHOCYTES # BLD AUTO: 1.47 X10*3/UL (ref 1.2–4.8)
LYMPHOCYTES NFR BLD AUTO: 27.8 %
MCH RBC QN AUTO: 29.6 PG (ref 26–34)
MCHC RBC AUTO-ENTMCNC: 33.8 G/DL (ref 32–36)
MCV RBC AUTO: 88 FL (ref 80–100)
MONOCYTES # BLD AUTO: 0.38 X10*3/UL (ref 0.1–1)
MONOCYTES NFR BLD AUTO: 7.2 %
NEUTROPHILS # BLD AUTO: 3.35 X10*3/UL (ref 1.2–7.7)
NEUTROPHILS NFR BLD AUTO: 63.3 %
NON HDL CHOLESTEROL: 182 MG/DL (ref 0–149)
NRBC BLD-RTO: 0.4 /100 WBCS (ref 0–0)
PLATELET # BLD AUTO: 219 X10*3/UL (ref 150–450)
POTASSIUM SERPL-SCNC: 4.1 MMOL/L (ref 3.5–5.3)
PROT SERPL-MCNC: 6.5 G/DL (ref 6.4–8.2)
RBC # BLD AUTO: 5.64 X10*6/UL (ref 4.5–5.9)
SODIUM SERPL-SCNC: 142 MMOL/L (ref 136–145)
TRIGL SERPL-MCNC: 187 MG/DL (ref 0–149)
TSH SERPL-ACNC: 1.01 MIU/L (ref 0.44–3.98)
VLDL: 37 MG/DL (ref 0–40)
WBC # BLD AUTO: 5.3 X10*3/UL (ref 4.4–11.3)

## 2024-09-23 PROCEDURE — 80061 LIPID PANEL: CPT

## 2024-09-23 PROCEDURE — 84443 ASSAY THYROID STIM HORMONE: CPT

## 2024-09-23 PROCEDURE — 36415 COLL VENOUS BLD VENIPUNCTURE: CPT

## 2024-09-23 PROCEDURE — 85025 COMPLETE CBC W/AUTO DIFF WBC: CPT

## 2024-09-23 PROCEDURE — 80053 COMPREHEN METABOLIC PANEL: CPT
